# Patient Record
Sex: FEMALE | Race: BLACK OR AFRICAN AMERICAN | NOT HISPANIC OR LATINO
[De-identification: names, ages, dates, MRNs, and addresses within clinical notes are randomized per-mention and may not be internally consistent; named-entity substitution may affect disease eponyms.]

---

## 2018-04-04 PROBLEM — Z00.00 ENCOUNTER FOR PREVENTIVE HEALTH EXAMINATION: Status: ACTIVE | Noted: 2018-04-04

## 2018-04-11 ENCOUNTER — APPOINTMENT (OUTPATIENT)
Dept: ORTHOPEDIC SURGERY | Facility: CLINIC | Age: 58
End: 2018-04-11

## 2018-05-24 ENCOUNTER — RESULT REVIEW (OUTPATIENT)
Age: 58
End: 2018-05-24

## 2020-02-20 ENCOUNTER — RESULT REVIEW (OUTPATIENT)
Age: 60
End: 2020-02-20

## 2022-03-03 ENCOUNTER — RESULT REVIEW (OUTPATIENT)
Age: 62
End: 2022-03-03

## 2022-10-03 ENCOUNTER — APPOINTMENT (OUTPATIENT)
Dept: CARDIOLOGY | Facility: CLINIC | Age: 62
End: 2022-10-03

## 2022-10-13 ENCOUNTER — APPOINTMENT (OUTPATIENT)
Dept: CARDIOLOGY | Facility: CLINIC | Age: 62
End: 2022-10-13

## 2022-10-13 ENCOUNTER — NON-APPOINTMENT (OUTPATIENT)
Age: 62
End: 2022-10-13

## 2022-10-13 VITALS
SYSTOLIC BLOOD PRESSURE: 150 MMHG | WEIGHT: 175 LBS | OXYGEN SATURATION: 100 % | HEIGHT: 67 IN | HEART RATE: 73 BPM | DIASTOLIC BLOOD PRESSURE: 100 MMHG | BODY MASS INDEX: 27.47 KG/M2

## 2022-10-13 DIAGNOSIS — U07.1 COVID-19: ICD-10-CM

## 2022-10-13 DIAGNOSIS — Z87.442 PERSONAL HISTORY OF URINARY CALCULI: ICD-10-CM

## 2022-10-13 PROCEDURE — 93000 ELECTROCARDIOGRAM COMPLETE: CPT

## 2022-10-13 PROCEDURE — 99204 OFFICE O/P NEW MOD 45 MIN: CPT | Mod: 25

## 2022-10-13 RX ORDER — AMLODIPINE BESYLATE 10 MG/1
10 TABLET ORAL
Refills: 0 | Status: ACTIVE | COMMUNITY

## 2022-10-13 NOTE — HISTORY OF PRESENT ILLNESS
[FreeTextEntry1] : Patient denies any history of MI, CHF or CP syndrome\par She's here for general cardiovascular porsche \par \par She has had a murmur for a while , but never had an echocardiogram\par \par For 2 weeks, she had constant left upper chest/shoulder/neck area pain.  She also had right sided neck cramp.  She eventually went to [facility] ER last Sunday (10/9/22) -. told of swelling an dmuscle tightness/spasm -. given a valium and toradol IV -> better\par She's been better everyday since -> was taking motrin -. 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

## 2022-10-13 NOTE — REVIEW OF SYSTEMS
[Negative] : Heme/Lymph [Joint Pain] : joint pain [Anxiety] : anxiety [FreeTextEntry5] : see HPI [FreeTextEntry9] : back pain

## 2022-10-18 ENCOUNTER — APPOINTMENT (OUTPATIENT)
Dept: CARDIOLOGY | Facility: CLINIC | Age: 62
End: 2022-10-18

## 2022-10-27 ENCOUNTER — APPOINTMENT (OUTPATIENT)
Dept: CARDIOLOGY | Facility: CLINIC | Age: 62
End: 2022-10-27

## 2022-11-03 ENCOUNTER — APPOINTMENT (OUTPATIENT)
Dept: CARDIOLOGY | Facility: CLINIC | Age: 62
End: 2022-11-03

## 2022-11-16 ENCOUNTER — APPOINTMENT (OUTPATIENT)
Dept: CARDIOLOGY | Facility: CLINIC | Age: 62
End: 2022-11-16

## 2022-11-16 PROCEDURE — SNS01: CPT

## 2023-01-09 ENCOUNTER — APPOINTMENT (OUTPATIENT)
Dept: BARIATRICS/WEIGHT MGMT | Facility: CLINIC | Age: 63
End: 2023-01-09

## 2023-04-26 ENCOUNTER — APPOINTMENT (OUTPATIENT)
Dept: HEMATOLOGY ONCOLOGY | Facility: CLINIC | Age: 63
End: 2023-04-26
Payer: COMMERCIAL

## 2023-04-26 ENCOUNTER — RESULT REVIEW (OUTPATIENT)
Age: 63
End: 2023-04-26

## 2023-04-26 VITALS
HEART RATE: 66 BPM | WEIGHT: 173 LBS | OXYGEN SATURATION: 98 % | TEMPERATURE: 98.2 F | BODY MASS INDEX: 27.15 KG/M2 | SYSTOLIC BLOOD PRESSURE: 150 MMHG | HEIGHT: 67 IN | DIASTOLIC BLOOD PRESSURE: 82 MMHG | RESPIRATION RATE: 18 BRPM

## 2023-04-26 DIAGNOSIS — F41.9 ANXIETY DISORDER, UNSPECIFIED: ICD-10-CM

## 2023-04-26 PROCEDURE — 99205 OFFICE O/P NEW HI 60 MIN: CPT | Mod: 25

## 2023-04-26 PROCEDURE — 36415 COLL VENOUS BLD VENIPUNCTURE: CPT

## 2023-04-28 LAB
25(OH)D3 SERPL-MCNC: 30.1 NG/ML
ALBUMIN SERPL ELPH-MCNC: 4.3 G/DL
ALP BLD-CCNC: 120 U/L
ALT SERPL-CCNC: 24 U/L
ANION GAP SERPL CALC-SCNC: 16 MMOL/L
AST SERPL-CCNC: 24 U/L
B2 MICROGLOB SERPL-MCNC: 1.6 MG/L
BILIRUB SERPL-MCNC: 0.5 MG/DL
BUN SERPL-MCNC: 11 MG/DL
CALCIUM SERPL-MCNC: 9.4 MG/DL
CHLORIDE SERPL-SCNC: 104 MMOL/L
CO2 SERPL-SCNC: 26 MMOL/L
CREAT SERPL-MCNC: 0.64 MG/DL
CREAT SPEC-SCNC: 147 MG/DL
CRP SERPL-MCNC: 3 MG/L
EGFR: 99 ML/MIN/1.73M2
FERRITIN SERPL-MCNC: 79 NG/ML
FOLATE SERPL-MCNC: 8.9 NG/ML
GLUCOSE SERPL-MCNC: 67 MG/DL
IRON SATN MFR SERPL: 21 %
IRON SERPL-MCNC: 66 UG/DL
LDH SERPL-CCNC: 205 U/L
MICROALBUMIN 24H UR DL<=1MG/L-MCNC: <1.2 MG/DL
MICROALBUMIN/CREAT 24H UR-RTO: NORMAL MG/G
POTASSIUM SERPL-SCNC: 5.5 MMOL/L
PROT SERPL-MCNC: 7.1 G/DL
SODIUM SERPL-SCNC: 146 MMOL/L
TIBC SERPL-MCNC: 316 UG/DL
UIBC SERPL-MCNC: 250 UG/DL
VIT B12 SERPL-MCNC: 811 PG/ML

## 2023-04-30 PROBLEM — F41.9 ANXIETY: Status: ACTIVE | Noted: 2022-10-13

## 2023-04-30 NOTE — HISTORY OF PRESENT ILLNESS
[Disease:__________________________] : Disease: [unfilled] [de-identified] : Ms. Molina is a 63 year old female with a PMH of sickle cell trait that presents for initial consultation referred by Dr. 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 \par \par She is seen by PCP Dr. Holland for HTN & anxiety\par \par She was seen in initial consultation by Dr. Sandra following ED visit to [facility] for 2 weeks of chest pain, work up unremarkable, and  noted following abnormal blood work. \par \par Known sickle cell trait. There are decreased Hgb A2 (1.5%) and a presence of a very lower percentage band noted near A2 (1.3%) consistent with a delta chain variety or hemoglobin A2 trait. 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 \par PCP- Dr. Holland \par gyn- pap smear 2022. normal . \par [facility]/EGD- 2021 . \par mammo/us- 12/2022 up to date. was told she has "fatty" breast\par \par smoker: never \par family hx: mother -> glioblastoma. father->Multiple myeloma . paternal aunt x 2 -> breast cancer ( 90's and 70's). maternal cousin-lung cancer.  [de-identified] : left hip -> pain x 6 months. getting PT. MRI schedule May 2023. spine doctor -Jadon.\par L5 piece broke ? fell down steps at home. WPH. 6 months ago. \par feels cold all the time. 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

## 2023-04-30 NOTE — REASON FOR VISIT
[Initial Consultation] : an initial consultation for [FreeTextEntry2] : sickle cell trait and abnormal migrating paraprotein

## 2023-04-30 NOTE — ASSESSMENT
[FreeTextEntry1] : Ms. Molina is a 63 year old female with a PMH of sickle cell trait that presents for initial consultation referred by Dr. 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 \par \par She is seen by PCP Dr. Holland for HTN & anxiety\par \par She was seen in initial consultation by Dr. Sandra following ED visit to [facility] [address] for 2 weeks of chest pain, work up unremarkable, and  noted following abnormal blood work. \par \par Known sickle cell trait. There are decreased Hgb A2 (1.5%) and a presence of a very lower percentage band noted near A2 (1.3%) consistent with a delta chain variety or hemoglobin A2 trait. 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  \par \par Patient referred for:\par PETCT \par Dexa

## 2023-05-02 LAB
ALBUMIN MFR SERPL ELPH: 55.5 %
ALBUMIN SERPL-MCNC: 3.9 G/DL
ALBUMIN/GLOB SERPL: 1.2 RATIO
ALPHA1 GLOB MFR SERPL ELPH: 4.9 %
ALPHA1 GLOB SERPL ELPH-MCNC: 0.3 G/DL
ALPHA2 GLOB MFR SERPL ELPH: 9.6 %
ALPHA2 GLOB SERPL ELPH-MCNC: 0.7 G/DL
B-GLOBULIN MFR SERPL ELPH: 14.3 %
B-GLOBULIN SERPL ELPH-MCNC: 1 G/DL
DEPRECATED KAPPA LC FREE/LAMBDA SER: 1.18 RATIO
GAMMA GLOB FLD ELPH-MCNC: 1.1 G/DL
GAMMA GLOB MFR SERPL ELPH: 15.7 %
IGA SER QL IEP: 445 MG/DL
IGG SER QL IEP: 1179 MG/DL
IGM SER QL IEP: 72 MG/DL
INTERPRETATION SERPL IEP-IMP: NORMAL
KAPPA LC CSF-MCNC: 2.14 MG/DL
KAPPA LC SERPL-MCNC: 2.53 MG/DL
M PROTEIN MFR SERPL ELPH: 2.6 %
M PROTEIN SPEC IFE-MCNC: NORMAL
MONOCLON BAND OBS SERPL: 0.2 G/DL
PROT SERPL-MCNC: 7.1 G/DL
PROT SERPL-MCNC: 7.1 G/DL

## 2023-05-03 LAB
ALBUPE: 18 %
ALPHA1UPE: 30.9 %
ALPHA2UPE: 19.5 %
BETAUPE: 16.9 %
GAMMAUPE: 14.7 %
IGA 24H UR QL IFE: NORMAL
KAPPA LC 24H UR QL: NORMAL
PROT PATTERN 24H UR ELPH-IMP: NORMAL
PROT UR-MCNC: 15 MG/DL
PROT UR-MCNC: 15 MG/DL

## 2023-06-07 ENCOUNTER — APPOINTMENT (OUTPATIENT)
Dept: HEMATOLOGY ONCOLOGY | Facility: CLINIC | Age: 63
End: 2023-06-07

## 2023-06-21 ENCOUNTER — RESULT REVIEW (OUTPATIENT)
Age: 63
End: 2023-06-21

## 2023-07-24 ENCOUNTER — RESULT REVIEW (OUTPATIENT)
Age: 63
End: 2023-07-24

## 2023-08-09 ENCOUNTER — APPOINTMENT (OUTPATIENT)
Dept: HEMATOLOGY ONCOLOGY | Facility: CLINIC | Age: 63
End: 2023-08-09
Payer: SELF-PAY

## 2023-08-09 ENCOUNTER — RESULT REVIEW (OUTPATIENT)
Age: 63
End: 2023-08-09

## 2023-08-09 ENCOUNTER — LABORATORY RESULT (OUTPATIENT)
Age: 63
End: 2023-08-09

## 2023-08-09 VITALS
RESPIRATION RATE: 18 BRPM | HEIGHT: 67 IN | DIASTOLIC BLOOD PRESSURE: 92 MMHG | BODY MASS INDEX: 27.47 KG/M2 | HEART RATE: 69 BPM | SYSTOLIC BLOOD PRESSURE: 163 MMHG | TEMPERATURE: 99.3 F | WEIGHT: 175 LBS | OXYGEN SATURATION: 97 %

## 2023-08-09 DIAGNOSIS — D63.8 ANEMIA IN OTHER CHRONIC DISEASES CLASSIFIED ELSEWHERE: ICD-10-CM

## 2023-08-09 DIAGNOSIS — M19.90 UNSPECIFIED OSTEOARTHRITIS, UNSPECIFIED SITE: ICD-10-CM

## 2023-08-09 DIAGNOSIS — D47.2 MONOCLONAL GAMMOPATHY: ICD-10-CM

## 2023-08-09 PROCEDURE — 36415 COLL VENOUS BLD VENIPUNCTURE: CPT

## 2023-08-09 PROCEDURE — 99214 OFFICE O/P EST MOD 30 MIN: CPT | Mod: 25

## 2023-08-09 RX ORDER — TURMERIC ROOT EXTRACT 500 MG
TABLET ORAL
Refills: 0 | Status: COMPLETED | COMMUNITY
End: 2023-08-09

## 2023-08-09 RX ORDER — DICLOFENAC SODIUM TOPICAL GEL, 1% 10 MG/G
1 GEL TOPICAL
Refills: 0 | Status: COMPLETED | COMMUNITY
End: 2023-08-09

## 2023-08-09 RX ORDER — CIDER VINEGAR 300 MG
TABLET ORAL
Refills: 0 | Status: COMPLETED | COMMUNITY
End: 2023-08-09

## 2023-08-09 NOTE — ASSESSMENT
[FreeTextEntry1] : Ms. Molina is a 63 year old female with a PMH of sickle cell trait that presents for initial consultation referred by Dr. 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   She is seen by PCP Dr. Holland for HTN & anxiety.  She was seen in initial consultation by Dr. Sandra following ED visit to [facility] for 2 weeks of chest pain, work up unremarkable, and noted following abnormal blood work.   Known sickle cell trait. There are decreased Hgb A2 (1.5%) and a presence of a very lower percentage band noted near A2 (1.3%) consistent with a delta chain variety or hemoglobin A2 trait. Total Hgb A2 is normal.  Hgb 11.1  IgG Kappa band identified on immunofixation. 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   ferritin 41  calcium 8.8  # Neuropathy- toes  We went over the implications of positive immunofixation and its range of clinical presentation from MGUS to multiple myeloma, lymphoma or amyloidosis and relation to osteoporosis. Labs were send for quantitative immunoglobins, immunofixation, B2 microglobulins as well as urine for BJP.    PETCT - reviewed, no evidence of bone involvement  M-spike 0.2 Patient referred for bone marrow biopsy.

## 2023-08-09 NOTE — HISTORY OF PRESENT ILLNESS
[Disease:__________________________] : Disease: [unfilled] [de-identified] : Ms. Molina is a 63 year old female with a PMH of sickle cell trait that presents for initial consultation referred by Dr. 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 \par  \par  She is seen by PCP Dr. Holland for HTN & anxiety\par  \par  She was seen in initial consultation by Dr. Sandra following ED visit to [facility] for 2 weeks of chest pain, work up unremarkable, and  noted following abnormal blood work. \par  \par  Known sickle cell trait. There are decreased Hgb A2 (1.5%) and a presence of a very lower percentage band noted near A2 (1.3%) consistent with a delta chain variety or hemoglobin A2 trait. 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 \par  PCP- Dr. Holland \par  gyn- pap smear 2022. normal . \par  [facility]/EGD- 2021 . \par  mammo/us- 12/2022 up to date. was told she has "fatty" breast\par  \par  smoker: never \par  family hx: mother -> glioblastoma. father->Multiple myeloma . paternal aunt x 2 -> breast cancer ( 90's and 70's). maternal cousin-lung cancer.  [de-identified] : patient was started on new BP medication but cant recall the name. Patient states she was started on it after a fall she had in May 2023 where she ended up in the hospital. Patient feeling overall well but does get some muscle cramps to the left leg, especially at nighttime. She describes the feeling as "electrical impulses" shooting down her leg.   patient would like to review results of PET and Bone Scan, results in chart.

## 2023-08-10 LAB
ALBUMIN SERPL ELPH-MCNC: 3.9 G/DL
ALP BLD-CCNC: 88 U/L
ALT SERPL-CCNC: 15 U/L
ANION GAP SERPL CALC-SCNC: 14 MMOL/L
APPEARANCE: CLEAR
AST SERPL-CCNC: 22 U/L
B2 MICROGLOB SERPL-MCNC: 1.6 MG/L
BILIRUB SERPL-MCNC: 0.6 MG/DL
BILIRUBIN URINE: NEGATIVE
BLOOD URINE: NEGATIVE
BUN SERPL-MCNC: 6 MG/DL
CALCIUM SERPL-MCNC: 9.1 MG/DL
CHLORIDE SERPL-SCNC: 102 MMOL/L
CO2 SERPL-SCNC: 26 MMOL/L
COLOR: YELLOW
CREAT SERPL-MCNC: 0.75 MG/DL
CREAT SPEC-SCNC: 91 MG/DL
CREAT/PROT UR: 0.1 RATIO
CRP SERPL-MCNC: <3 MG/L
EGFR: 89 ML/MIN/1.73M2
GLUCOSE QUALITATIVE U: NEGATIVE MG/DL
GLUCOSE SERPL-MCNC: 67 MG/DL
KETONES URINE: NEGATIVE MG/DL
LDH SERPL-CCNC: 221 U/L
LEUKOCYTE ESTERASE URINE: ABNORMAL
MAGNESIUM SERPL-MCNC: 2.1 MG/DL
NITRITE URINE: NEGATIVE
PH URINE: 6.5
PHOSPHATE SERPL-MCNC: 2.8 MG/DL
POTASSIUM SERPL-SCNC: 4.1 MMOL/L
PROT SERPL-MCNC: 6.8 G/DL
PROT UR-MCNC: 11 MG/DL
PROT UR-MCNC: 11 MG/DL
PROTEIN URINE: NEGATIVE MG/DL
SODIUM SERPL-SCNC: 142 MMOL/L
SPECIFIC GRAVITY URINE: 1.01
URATE SERPL-MCNC: 3 MG/DL
UROBILINOGEN URINE: 1 MG/DL

## 2023-08-12 LAB
DEPRECATED KAPPA LC FREE/LAMBDA SER: 1.34 RATIO
DEPRECATED KAPPA LC FREE/LAMBDA SER: 1.34 RATIO
IGA 24H UR QL IFE: NORMAL
IGA SER QL IEP: 355 MG/DL
IGG SER QL IEP: 873 MG/DL
IGM SER QL IEP: 52 MG/DL
KAPPA LC 24H UR QL: NORMAL
KAPPA LC CSF-MCNC: 1.5 MG/DL
KAPPA LC CSF-MCNC: 1.5 MG/DL
KAPPA LC SERPL-MCNC: 2.01 MG/DL
KAPPA LC SERPL-MCNC: 2.01 MG/DL

## 2023-08-14 LAB — M PROTEIN SPEC IFE-MCNC: NORMAL

## 2023-08-15 LAB
ALBUMIN MFR SERPL ELPH: 54 %
ALBUMIN SERPL-MCNC: 3.7 G/DL
ALBUMIN/GLOB SERPL: 1.2 RATIO
ALPHA1 GLOB MFR SERPL ELPH: 5.3 %
ALPHA1 GLOB SERPL ELPH-MCNC: 0.4 G/DL
ALPHA2 GLOB MFR SERPL ELPH: 10 %
ALPHA2 GLOB SERPL ELPH-MCNC: 0.7 G/DL
B-GLOBULIN MFR SERPL ELPH: 15.2 %
B-GLOBULIN SERPL ELPH-MCNC: 1 G/DL
GAMMA GLOB FLD ELPH-MCNC: 1.1 G/DL
GAMMA GLOB MFR SERPL ELPH: 15.5 %
INTERPRETATION SERPL IEP-IMP: NORMAL
M PROTEIN MFR SERPL ELPH: 3.1 %
MONOCLON BAND OBS SERPL: 0.2 G/DL
PROT SERPL-MCNC: 6.8 G/DL
PROT SERPL-MCNC: 6.8 G/DL

## 2023-12-11 ENCOUNTER — APPOINTMENT (OUTPATIENT)
Dept: CARDIOLOGY | Facility: CLINIC | Age: 63
End: 2023-12-11
Payer: COMMERCIAL

## 2023-12-11 VITALS
HEART RATE: 79 BPM | DIASTOLIC BLOOD PRESSURE: 90 MMHG | OXYGEN SATURATION: 99 % | HEIGHT: 67 IN | BODY MASS INDEX: 27.62 KG/M2 | SYSTOLIC BLOOD PRESSURE: 146 MMHG | WEIGHT: 176 LBS

## 2023-12-11 VITALS — SYSTOLIC BLOOD PRESSURE: 150 MMHG | DIASTOLIC BLOOD PRESSURE: 90 MMHG

## 2023-12-11 DIAGNOSIS — M17.0 BILATERAL PRIMARY OSTEOARTHRITIS OF KNEE: ICD-10-CM

## 2023-12-11 DIAGNOSIS — N28.1 CYST OF KIDNEY, ACQUIRED: ICD-10-CM

## 2023-12-11 PROCEDURE — 93000 ELECTROCARDIOGRAM COMPLETE: CPT

## 2023-12-11 PROCEDURE — 99214 OFFICE O/P EST MOD 30 MIN: CPT | Mod: 25

## 2023-12-11 RX ORDER — LOSARTAN POTASSIUM 25 MG/1
25 TABLET, FILM COATED ORAL
Qty: 30 | Refills: 0 | Status: ACTIVE | COMMUNITY
Start: 2023-07-13

## 2023-12-12 ENCOUNTER — NON-APPOINTMENT (OUTPATIENT)
Age: 63
End: 2023-12-12

## 2023-12-12 PROBLEM — M17.0 OSTEOARTHRITIS OF BOTH KNEES, UNSPECIFIED OSTEOARTHRITIS TYPE: Status: ACTIVE | Noted: 2023-12-12

## 2023-12-12 PROBLEM — N28.1 COMPLEX RENAL CYST: Status: ACTIVE | Noted: 2022-10-13

## 2024-01-22 ENCOUNTER — APPOINTMENT (OUTPATIENT)
Dept: CARDIOLOGY | Facility: CLINIC | Age: 64
End: 2024-01-22

## 2024-05-06 ENCOUNTER — RESULT REVIEW (OUTPATIENT)
Age: 64
End: 2024-05-06

## 2024-06-27 ENCOUNTER — APPOINTMENT (OUTPATIENT)
Dept: CARDIOLOGY | Facility: CLINIC | Age: 64
End: 2024-06-27
Payer: COMMERCIAL

## 2024-06-27 PROCEDURE — 93306 TTE W/DOPPLER COMPLETE: CPT

## 2024-07-02 ENCOUNTER — NON-APPOINTMENT (OUTPATIENT)
Age: 64
End: 2024-07-02

## 2024-07-02 ENCOUNTER — APPOINTMENT (OUTPATIENT)
Dept: CARDIOLOGY | Facility: CLINIC | Age: 64
End: 2024-07-02
Payer: COMMERCIAL

## 2024-07-02 VITALS
WEIGHT: 175 LBS | SYSTOLIC BLOOD PRESSURE: 141 MMHG | HEART RATE: 80 BPM | DIASTOLIC BLOOD PRESSURE: 92 MMHG | BODY MASS INDEX: 27.41 KG/M2 | OXYGEN SATURATION: 98 %

## 2024-07-02 DIAGNOSIS — R93.1 ABNORMAL FINDINGS ON DIAGNOSTIC IMAGING OF HEART AND CORONARY CIRCULATION: ICD-10-CM

## 2024-07-02 DIAGNOSIS — R91.1 SOLITARY PULMONARY NODULE: ICD-10-CM

## 2024-07-02 DIAGNOSIS — R07.9 CHEST PAIN, UNSPECIFIED: ICD-10-CM

## 2024-07-02 DIAGNOSIS — R73.03 PREDIABETES.: ICD-10-CM

## 2024-07-02 DIAGNOSIS — Z92.89 PERSONAL HISTORY OF OTHER MEDICAL TREATMENT: ICD-10-CM

## 2024-07-02 DIAGNOSIS — I10 ESSENTIAL (PRIMARY) HYPERTENSION: ICD-10-CM

## 2024-07-02 DIAGNOSIS — E78.5 HYPERLIPIDEMIA, UNSPECIFIED: ICD-10-CM

## 2024-07-02 DIAGNOSIS — R01.1 CARDIAC MURMUR, UNSPECIFIED: ICD-10-CM

## 2024-07-02 PROCEDURE — 93000 ELECTROCARDIOGRAM COMPLETE: CPT

## 2024-07-02 PROCEDURE — 99214 OFFICE O/P EST MOD 30 MIN: CPT | Mod: 25

## 2024-07-02 RX ORDER — NAPROXEN SODIUM 220 MG
220 TABLET ORAL
Refills: 0 | Status: ACTIVE | COMMUNITY

## 2024-07-02 RX ORDER — ACETAMINOPHEN 500 MG
500 TABLET ORAL
Refills: 0 | Status: ACTIVE | COMMUNITY

## 2024-07-03 PROBLEM — Z92.89 HISTORY OF ECHOCARDIOGRAM: Status: RESOLVED | Noted: 2024-07-02 | Resolved: 2024-07-03

## 2025-02-03 ENCOUNTER — NON-APPOINTMENT (OUTPATIENT)
Age: 65
End: 2025-02-03

## 2025-02-03 ENCOUNTER — APPOINTMENT (OUTPATIENT)
Dept: CARDIOLOGY | Facility: CLINIC | Age: 65
End: 2025-02-03
Payer: COMMERCIAL

## 2025-02-03 VITALS
OXYGEN SATURATION: 97 % | WEIGHT: 160 LBS | HEART RATE: 73 BPM | SYSTOLIC BLOOD PRESSURE: 120 MMHG | BODY MASS INDEX: 25.11 KG/M2 | DIASTOLIC BLOOD PRESSURE: 72 MMHG | HEIGHT: 67 IN

## 2025-02-03 DIAGNOSIS — R07.9 CHEST PAIN, UNSPECIFIED: ICD-10-CM

## 2025-02-03 DIAGNOSIS — R93.1 ABNORMAL FINDINGS ON DIAGNOSTIC IMAGING OF HEART AND CORONARY CIRCULATION: ICD-10-CM

## 2025-02-03 DIAGNOSIS — R01.1 CARDIAC MURMUR, UNSPECIFIED: ICD-10-CM

## 2025-02-03 DIAGNOSIS — I10 ESSENTIAL (PRIMARY) HYPERTENSION: ICD-10-CM

## 2025-02-03 DIAGNOSIS — E78.5 HYPERLIPIDEMIA, UNSPECIFIED: ICD-10-CM

## 2025-02-03 DIAGNOSIS — R73.03 PREDIABETES.: ICD-10-CM

## 2025-02-03 PROCEDURE — 93000 ELECTROCARDIOGRAM COMPLETE: CPT

## 2025-02-03 PROCEDURE — 99214 OFFICE O/P EST MOD 30 MIN: CPT | Mod: 25

## 2025-02-04 PROBLEM — R93.1 AGATSTON CORONARY ARTERY CALCIUM SCORE LESS THAN 100: Status: RESOLVED | Noted: 2024-05-22 | Resolved: 2025-02-04

## 2025-02-04 RX ORDER — ROSUVASTATIN CALCIUM 5 MG/1
5 TABLET, FILM COATED ORAL
Qty: 90 | Refills: 3 | Status: ACTIVE | COMMUNITY
Start: 2025-02-04 | End: 1900-01-01

## 2025-02-04 RX ORDER — TRETINOIN 0.25 MG/G
0.03 CREAM TOPICAL
Qty: 45 | Refills: 0 | Status: ACTIVE | COMMUNITY
Start: 2024-08-12

## 2025-02-04 RX ORDER — TRIAMCINOLONE ACETONIDE 1 MG/G
0.1 CREAM TOPICAL
Qty: 80 | Refills: 0 | Status: ACTIVE | COMMUNITY
Start: 2025-01-04

## 2025-03-26 ENCOUNTER — RESULT REVIEW (OUTPATIENT)
Age: 65
End: 2025-03-26

## 2025-03-26 ENCOUNTER — APPOINTMENT (OUTPATIENT)
Dept: HEMATOLOGY ONCOLOGY | Facility: CLINIC | Age: 65
End: 2025-03-26
Payer: COMMERCIAL

## 2025-03-26 VITALS
RESPIRATION RATE: 17 BRPM | BODY MASS INDEX: 24.01 KG/M2 | WEIGHT: 153 LBS | DIASTOLIC BLOOD PRESSURE: 94 MMHG | OXYGEN SATURATION: 98 % | HEIGHT: 67 IN | SYSTOLIC BLOOD PRESSURE: 146 MMHG | HEART RATE: 75 BPM | TEMPERATURE: 97.8 F

## 2025-03-26 DIAGNOSIS — M19.90 UNSPECIFIED OSTEOARTHRITIS, UNSPECIFIED SITE: ICD-10-CM

## 2025-03-26 DIAGNOSIS — D47.2 MONOCLONAL GAMMOPATHY: ICD-10-CM

## 2025-03-26 DIAGNOSIS — G62.9 POLYNEUROPATHY, UNSPECIFIED: ICD-10-CM

## 2025-03-26 DIAGNOSIS — D64.89 OTHER SPECIFIED ANEMIAS: ICD-10-CM

## 2025-03-26 PROCEDURE — 99214 OFFICE O/P EST MOD 30 MIN: CPT

## 2025-04-02 ENCOUNTER — RESULT REVIEW (OUTPATIENT)
Age: 65
End: 2025-04-02

## 2025-05-07 ENCOUNTER — APPOINTMENT (OUTPATIENT)
Dept: HEMATOLOGY ONCOLOGY | Facility: CLINIC | Age: 65
End: 2025-05-07
Payer: COMMERCIAL

## 2025-05-07 ENCOUNTER — RESULT REVIEW (OUTPATIENT)
Age: 65
End: 2025-05-07

## 2025-05-07 VITALS
HEIGHT: 67 IN | TEMPERATURE: 99.1 F | SYSTOLIC BLOOD PRESSURE: 142 MMHG | WEIGHT: 151.8 LBS | OXYGEN SATURATION: 99 % | DIASTOLIC BLOOD PRESSURE: 88 MMHG | RESPIRATION RATE: 18 BRPM | BODY MASS INDEX: 23.83 KG/M2 | HEART RATE: 77 BPM

## 2025-05-07 DIAGNOSIS — D64.89 OTHER SPECIFIED ANEMIAS: ICD-10-CM

## 2025-05-07 DIAGNOSIS — D47.2 MONOCLONAL GAMMOPATHY: ICD-10-CM

## 2025-05-07 DIAGNOSIS — G62.9 POLYNEUROPATHY, UNSPECIFIED: ICD-10-CM

## 2025-05-07 PROCEDURE — 99214 OFFICE O/P EST MOD 30 MIN: CPT
